# Patient Record
Sex: MALE | Race: WHITE | NOT HISPANIC OR LATINO | Employment: UNEMPLOYED | URBAN - METROPOLITAN AREA
[De-identification: names, ages, dates, MRNs, and addresses within clinical notes are randomized per-mention and may not be internally consistent; named-entity substitution may affect disease eponyms.]

---
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
## 2024-10-26 ENCOUNTER — OFFICE VISIT (OUTPATIENT)
Dept: URGENT CARE | Facility: CLINIC | Age: 19
End: 2024-10-26
Payer: COMMERCIAL

## 2024-10-26 VITALS
DIASTOLIC BLOOD PRESSURE: 76 MMHG | RESPIRATION RATE: 18 BRPM | SYSTOLIC BLOOD PRESSURE: 136 MMHG | HEIGHT: 72 IN | WEIGHT: 195.2 LBS | BODY MASS INDEX: 26.44 KG/M2 | HEART RATE: 80 BPM | TEMPERATURE: 98.4 F | OXYGEN SATURATION: 100 %

## 2024-10-26 DIAGNOSIS — M54.6 ACUTE RIGHT-SIDED THORACIC BACK PAIN: ICD-10-CM

## 2024-10-26 DIAGNOSIS — M62.830 SPASM OF THORACIC BACK MUSCLE: Primary | ICD-10-CM

## 2024-10-26 PROCEDURE — 99214 OFFICE O/P EST MOD 30 MIN: CPT | Performed by: NURSE PRACTITIONER

## 2024-10-26 RX ORDER — LIDOCAINE 50 MG/G
1 PATCH TOPICAL DAILY
Qty: 6 PATCH | Refills: 0 | Status: SHIPPED | OUTPATIENT
Start: 2024-10-26

## 2024-10-26 RX ORDER — METHOCARBAMOL 500 MG/1
500 TABLET, FILM COATED ORAL 3 TIMES DAILY
Qty: 30 TABLET | Refills: 0 | Status: SHIPPED | OUTPATIENT
Start: 2024-10-26

## 2024-10-26 NOTE — PROGRESS NOTES
[facility]        NAME: Mesfin Polo is a 19 y.o. male  : 2005    MRN: [id number]  DATE: 2024  TIME: 4:07 PM    Assessment and Plan   Spasm of thoracic back muscle [M62.830]  1. Spasm of thoracic back muscle  methocarbamol (ROBAXIN) 500 mg tablet    lidocaine (Lidoderm) 5 %    right      2. Acute right-sided thoracic back pain  methocarbamol (ROBAXIN) 500 mg tablet    lidocaine (Lidoderm) 5 %            Patient Instructions       Follow up with PCP in 3-5 days.  Proceed to  ER if symptoms worsen.    If tests have been performed at [facility], our office will contact you with results if changes need to be made to the care plan discussed with you at the visit.  You can [facility] full [facility]    You are to continue the naproxen and biofreeze.    You are to take the robaxin for muscle spasms as directed.  Do NOT drink alcohol or drive machinery while taking.  Use the lidoderm patches 12 hours on and 12 hours off when not using the Biofreeze.  Your xray was preliminarily read by your provider.  A radiologist will read the xray and you will be notified if it is abnormal.    You are to Rest, Ice, compression (ace wrap, splint) elevate  Do not remove the splint until you are instructed to do so.   Take tylenol or motrin as needed.  You are to see your PCP   Go to the ED if symptoms worsen.    Chief Complaint     Chief Complaint   Patient presents with    Back Pain     Patient c/o right lower back pain that started 5 days ago, denies injury.  Patient taking Naproxen and using biofreeze to area, some improvement.           History of Present Illness       This is a 19 year old male who states on  went to the gym and on Monday noted that he was having right lower back pain. 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  HE states that he has been taking naproxen and biofreeze with some  relief at times.  He denies urinary symptoms.  Denies falling/trauma.  He states the pain is a shooting pain.  PMH is listed and reviewed     Back Pain        Review of Systems   Review of Systems   Constitutional: Negative.    HENT: Negative.     Eyes: Negative.    Respiratory: Negative.     Cardiovascular: Negative.    Gastrointestinal: Negative.    Endocrine: Negative.    Genitourinary: Negative.    Musculoskeletal:  Positive for back pain.   Skin: Negative.    Allergic/Immunologic: Negative.    Neurological: Negative.    Hematological: Negative.    Psychiatric/Behavioral: Negative.           Current Medications       Current Outpatient Medications:     lidocaine (Lidoderm) 5 %, Apply 1 patch topically over 12 hours daily Remove & Discard patch within 12 hours or as directed by MD, Disp: 6 patch, Rfl: 0    methocarbamol (ROBAXIN) 500 mg tablet, Take 1 tablet (500 mg total) by mouth 3 (three) times a day, Disp: 30 tablet, Rfl: 0    Current Allergies     Allergies as of 10/26/2024    (No Known Allergies)            The following portions of the patient's history were reviewed and updated as appropriate: allergies, current medications, past family history, past medical history, past social history, past surgical history and problem list.     History reviewed. No pertinent past medical history.    History reviewed. No pertinent surgical history.    History reviewed. No pertinent family history.      Medications have been verified.        Objective   /76   Pulse 80   Temp 98.4 °F (36.9 °C) (Temporal)   Resp 18   Ht 6' (1.829 m)   Wt 88.5 kg (195 lb 3.2 oz)   SpO2 100%   BMI 26.47 kg/m²   No LMP for male patient.       Physical Exam     Physical Exam  Vitals and nursing note reviewed.   Constitutional:       General: He is not in acute distress.     Appearance: He is normal weight. He is not ill-appearing, toxic-appearing or diaphoretic.   HENT:      Head: Normocephalic and atraumatic.   Eyes:       Extraocular Movements: Extraocular movements intact.   Cardiovascular:      Rate and Rhythm: Normal rate.      Pulses: Normal pulses.   Pulmonary:      Effort: Pulmonary effort is normal.   Musculoskeletal:         General: Tenderness present. No swelling, deformity or signs of injury.      Cervical back: Normal range of motion.   Skin:     General: Skin is warm and dry.      Capillary Refill: Capillary refill takes less than 2 seconds.          Neurological:      General: No focal deficit present.      Mental Status: He is alert and oriented to person, place, and time.   Psychiatric:         Mood and Affect: Mood normal.         Behavior: Behavior normal.         Thought Content: Thought content normal.         Judgment: Judgment normal.       Diagnosis: spasm of thoracic back muscle, right sided thoracic pain  Test ordered/reviewed:  External documents reviewed:   EMR PMH   History from:  patient   Discussion with other provider:  Independent visualization and interpretation:    Plan:    Robaxin, lidoderm patches  Home plan listed in discharge instructions     Risk of complications/and or morbidity or mortality:    Straight Forward  Low  Moderate  High

## 2024-10-26 NOTE — PATIENT INSTRUCTIONS
You are to continue the naproxen and biofreeze.    You are to take the robaxin for muscle spasms as directed.  Do NOT drink alcohol or drive machinery while taking.  Use the lidoderm patches 12 hours on and 12 hours off when not using the Biofreeze.  Your xray was preliminarily read by your provider.  A radiologist will read the xray and you will be notified if it is abnormal.    You are to Rest, Ice, compression (ace wrap, splint) elevate  Do not remove the splint until you are instructed to do so.   Take tylenol or motrin as needed.  You are to see your PCP   Go to the ED if symptoms worsen.

## 2025-04-26 ENCOUNTER — OFFICE VISIT (OUTPATIENT)
Dept: URGENT CARE | Facility: CLINIC | Age: 20
End: 2025-04-26
Payer: COMMERCIAL

## 2025-04-26 VITALS
WEIGHT: 200 LBS | HEART RATE: 66 BPM | SYSTOLIC BLOOD PRESSURE: 134 MMHG | DIASTOLIC BLOOD PRESSURE: 64 MMHG | BODY MASS INDEX: 27.09 KG/M2 | TEMPERATURE: 98.3 F | HEIGHT: 72 IN | RESPIRATION RATE: 18 BRPM | OXYGEN SATURATION: 98 %

## 2025-04-26 DIAGNOSIS — L24.9 IRRITANT CONTACT DERMATITIS, UNSPECIFIED TRIGGER: Primary | ICD-10-CM

## 2025-04-26 PROCEDURE — 99213 OFFICE O/P EST LOW 20 MIN: CPT | Performed by: NURSE PRACTITIONER

## 2025-04-26 RX ORDER — BETAMETHASONE VALERATE 1.2 MG/G
CREAM TOPICAL 2 TIMES DAILY PRN
Qty: 45 G | Refills: 1 | Status: SHIPPED | OUTPATIENT
Start: 2025-04-26

## 2025-04-26 RX ORDER — HYDROXYZINE HYDROCHLORIDE 25 MG/1
25 TABLET, FILM COATED ORAL EVERY 6 HOURS PRN
COMMUNITY

## 2025-04-26 RX ORDER — PREDNISONE 10 MG/1
TABLET ORAL
Qty: 30 TABLET | Refills: 0 | Status: SHIPPED | OUTPATIENT
Start: 2025-04-26

## 2025-04-26 NOTE — PATIENT INSTRUCTIONS
The rash on your forearms looks like a contact dermatitis. Your hands appear to have a dyshidrotic eczema vs psoriasis--follow-up with derm as planned.  You may use the same cream on your hands during flares but do not use for more than 2 weeks in a row as it may thin the skin.  The cream also increases sun sensitivity.

## 2025-04-26 NOTE — PROGRESS NOTES
[facility]        NAME: Mesfin Ploo is a 19 y.o. male  : 2005    MRN: [id number]  DATE: 2025  TIME: 9:16 AM      Assessment and Plan     Irritant contact dermatitis, unspecified trigger [L24.9]  1. Irritant contact dermatitis, unspecified trigger  betamethasone valerate (VALISONE) 0.1 % cream    predniSONE 10 mg tablet            Patient Instructions     Patient Instructions   The rash on your forearms looks like a contact dermatitis. Your hands appear to have a dyshidrotic eczema vs psoriasis--follow-up with derm as planned.  You may use the same cream on your hands during flares but do not use for more than 2 weeks in a row as it may thin the skin.  The cream also increases sun sensitivity.    Follow up with PCP in 3-5 days.  Proceed to  ER if symptoms worsen.    Chief Complaint     Chief Complaint   Patient presents with    Rash     Started 5 days ago rash on both arms. Worse on left arm. Right arm he scratch open during his sleep.          History of Present Illness     Patient presents for rash on bilateral forearms.  He also has rash on bilateral hands but states that this is a chronic problem.  He has seen dermatology in the past for a hand rash and used to be on Dupixent for it which had helped but is currently off of that due to an insurance problem.  He has been using bag balm, gloves, etc. on his hands which helps some but he has had more frequent flares of dry cracked sore open areas.  He has been trying to get back in to dermatology but notes that there is a long wait.    He has never had trouble with rash on his forearms before and notes that this rash looks different.  He is not certain of the trigger as he has never been allergic to anything but does state that he was recently working with a grout and when he was cleaning out the bucket had his arms in the bucket up to the mid upper arm, exactly where the rash stops.  He notes that the few scattered open areas  are due to scratching in his sleep.  He has tried to avoid this noting that he will sleep with gloves, long socks on his arms, etc. but that he takes them off in his sleep.  He does have some Atarax at home previously prescribed by dermatology to help with itching as needed.  He has taken this at bedtime over the last few days which has helped with the itching.        Review of Systems     Review of Systems   Skin:  Positive for rash.   All other systems reviewed and are negative.        Current Medications       Current Outpatient Medications:     betamethasone valerate (VALISONE) 0.1 % cream, Apply topically 2 (two) times a day as needed for irritation or rash Up to 2 weeks in a given area, Disp: 45 g, Rfl: 1    hydrOXYzine HCL (ATARAX) 25 mg tablet, Take 25 mg by mouth every 6 (six) hours as needed for itching, Disp: , Rfl:     predniSONE 10 mg tablet, 5 tabs daily x 2 days, 4 tabs daily x 2 days, 3 tabs daily x 2 days, 2 tabs daily x 2 days, 1 tab daily x 2 days, Disp: 30 tablet, Rfl: 0    Current Allergies     Allergies as of 04/26/2025    (No Known Allergies)              The following portions of the patient's history were reviewed and updated as appropriate: allergies, current medications, past family history, past medical history, past social history, past surgical history and problem list.     No past medical history on file.    No past surgical history on file.    No family history on file.      Medications have been verified.        Objective     /64   Pulse 66   Temp 98.3 °F (36.8 °C)   Resp 18   Ht 6' (1.829 m)   Wt 90.7 kg (200 lb)   SpO2 98%   BMI 27.12 kg/m²   No LMP for male patient.         Physical Exam     Physical Exam  Vitals and nursing note reviewed.   Constitutional:       General: He is not in acute distress.     Appearance: Normal appearance. He is well-developed. He is not ill-appearing, toxic-appearing or diaphoretic.   HENT:      Head: Normocephalic and atraumatic.    Pulmonary:      Effort: Pulmonary effort is normal. No respiratory distress.   Musculoskeletal:         General: Normal range of motion.   Skin:     General: Skin is warm and dry.      Capillary Refill: Capillary refill takes less than 2 seconds.      Findings: Rash present. Rash is papular and urticarial.      Comments: Bilateral arms from mid upper arm down to wrists with raised papular and somewhat urticarial rash consistent with a contact Derm.    Bilateral hands with redness, no increased warmth and no drainage, especially over middle and distal phalanxes with multiple moderately sized skin fissures present.   Neurological:      General: No focal deficit present.      Mental Status: He is alert and oriented to person, place, and time.   Psychiatric:         Mood and Affect: Mood normal.         Behavior: Behavior normal.         Thought Content: Thought content normal.         Judgment: Judgment normal.